# Patient Record
Sex: FEMALE | Race: WHITE
[De-identification: names, ages, dates, MRNs, and addresses within clinical notes are randomized per-mention and may not be internally consistent; named-entity substitution may affect disease eponyms.]

---

## 2018-11-08 ENCOUNTER — HOSPITAL ENCOUNTER (EMERGENCY)
Dept: HOSPITAL 19 - COL.ER | Age: 33
Discharge: HOME | End: 2018-11-08
Payer: SELF-PAY

## 2018-11-08 VITALS — BODY MASS INDEX: 33.53 KG/M2 | WEIGHT: 247.58 LBS | HEIGHT: 72 IN

## 2018-11-08 VITALS — SYSTOLIC BLOOD PRESSURE: 130 MMHG | TEMPERATURE: 98.9 F | HEART RATE: 82 BPM | DIASTOLIC BLOOD PRESSURE: 69 MMHG
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
## 2018-11-08 DIAGNOSIS — S00.93XA: Primary | ICD-10-CM

## 2019-03-26 ENCOUNTER — HOSPITAL ENCOUNTER (EMERGENCY)
Dept: HOSPITAL 19 - COL.ER | Age: 34
Discharge: HOME | End: 2019-03-26
Payer: SELF-PAY

## 2019-03-26 VITALS — BODY MASS INDEX: 32.16 KG/M2 | WEIGHT: 237.44 LBS | HEIGHT: 72 IN

## 2019-03-26 VITALS — SYSTOLIC BLOOD PRESSURE: 102 MMHG | DIASTOLIC BLOOD PRESSURE: 74 MMHG | HEART RATE: 63 BPM

## 2019-03-26 DIAGNOSIS — S06.0X0A: Primary | ICD-10-CM
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
## 2019-03-26 DIAGNOSIS — S14.109A: ICD-10-CM

## 2019-03-26 DIAGNOSIS — S70.01XA: ICD-10-CM

## 2019-03-26 DIAGNOSIS — H72.92: ICD-10-CM

## 2019-03-26 DIAGNOSIS — Z88.0: ICD-10-CM

## 2019-03-26 DIAGNOSIS — F17.210: ICD-10-CM
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
## 2019-03-26 DIAGNOSIS — S24.109A: ICD-10-CM

## 2019-03-26 LAB
ALBUMIN SERPL-MCNC: 4 GM/DL (ref 3.5–5)
ALP SERPL-CCNC: 74 U/L (ref 50–136)
ALT SERPL-CCNC: 8 U/L (ref 9–52)
ANION GAP SERPL CALC-SCNC: 7 MMOL/L (ref 7–16)
AST SERPL-CCNC: 14 U/L (ref 15–37)
BASOPHILS # BLD: 0 10*3/UL (ref 0–0.2)
BASOPHILS NFR BLD AUTO: 0.4 % (ref 0–2)
BILIRUB SERPL-MCNC: 0.1 MG/DL (ref 0–1)
BUN SERPL-MCNC: 13 MG/DL (ref 7–17)
CALCIUM SERPL-MCNC: 9.3 MG/DL (ref 8.4–10.2)
CHLORIDE SERPL-SCNC: 106 MMOL/L (ref 98–107)
CO2 SERPL-SCNC: 25 MMOL/L (ref 22–30)
CREAT SERPL-SCNC: 0.81 MG/DL (ref 0.52–1.25)
EOSINOPHIL # BLD: 0.1 10*3/UL (ref 0–0.7)
EOSINOPHIL NFR BLD: 0.9 % (ref 0–4)
ERYTHROCYTE [DISTWIDTH] IN BLOOD BY AUTOMATED COUNT: 18 % (ref 11.5–14.5)
GLUCOSE SERPL-MCNC: 102 MG/DL (ref 74–106)
GRANULOCYTES # BLD AUTO: 69.9 % (ref 42.2–75.2)
HCT VFR BLD AUTO: 31.9 % (ref 37–47)
HGB BLD-MCNC: 9.7 G/DL (ref 12.5–16)
LYMPHOCYTES # BLD: 2.3 10*3/UL (ref 1.2–3.4)
LYMPHOCYTES NFR BLD: 21.7 % (ref 20–51)
MCH RBC QN AUTO: 23 PG (ref 27–31)
MCHC RBC AUTO-ENTMCNC: 30 G/DL (ref 33–37)
MCV RBC AUTO: 75 FL (ref 80–100)
MONOCYTES # BLD: 0.7 10*3/UL (ref 0.1–0.6)
MONOCYTES NFR BLD AUTO: 6.8 % (ref 1.7–9.3)
NEUTROPHILS # BLD: 7.4 10*3/UL (ref 1.4–6.5)
PLATELET # BLD AUTO: 270 K/MM3 (ref 130–400)
PMV BLD AUTO: 9.5 FL (ref 7.4–10.4)
POTASSIUM SERPL-SCNC: 3.8 MMOL/L (ref 3.4–5)
PROT SERPL-MCNC: 7.5 GM/DL (ref 6.4–8.2)
RBC # BLD AUTO: 4.28 M/MM3 (ref 4.1–5.3)
SODIUM SERPL-SCNC: 139 MMOL/L (ref 137–145)

## 2020-03-16 ENCOUNTER — HOSPITAL ENCOUNTER (OUTPATIENT)
Dept: HOSPITAL 19 - COL.ER | Age: 35
LOS: 1 days | Discharge: HOME | End: 2020-03-17
Attending: UROLOGY
Payer: SELF-PAY

## 2020-03-16 VITALS — DIASTOLIC BLOOD PRESSURE: 47 MMHG | SYSTOLIC BLOOD PRESSURE: 104 MMHG | HEART RATE: 53 BPM | TEMPERATURE: 98.5 F

## 2020-03-16 VITALS — WEIGHT: 271.61 LBS | BODY MASS INDEX: 36.79 KG/M2 | HEIGHT: 72 IN

## 2020-03-16 VITALS — TEMPERATURE: 98.2 F | HEART RATE: 69 BPM | DIASTOLIC BLOOD PRESSURE: 55 MMHG | SYSTOLIC BLOOD PRESSURE: 114 MMHG

## 2020-03-16 VITALS — SYSTOLIC BLOOD PRESSURE: 105 MMHG | TEMPERATURE: 98.1 F | DIASTOLIC BLOOD PRESSURE: 51 MMHG | HEART RATE: 66 BPM

## 2020-03-16 DIAGNOSIS — N20.1: Primary | ICD-10-CM

## 2020-03-16 DIAGNOSIS — Z88.0: ICD-10-CM

## 2020-03-16 DIAGNOSIS — Z90.49: ICD-10-CM

## 2020-03-16 DIAGNOSIS — Z90.89: ICD-10-CM

## 2020-03-16 DIAGNOSIS — F17.210: ICD-10-CM

## 2020-03-16 DIAGNOSIS — Z88.8: ICD-10-CM

## 2020-03-16 LAB
ALBUMIN SERPL-MCNC: 4.8 GM/DL (ref 3.5–5)
ALP SERPL-CCNC: 86 U/L (ref 50–136)
ALT SERPL-CCNC: 16 U/L (ref 4–34)
ANION GAP SERPL CALC-SCNC: 12 MMOL/L (ref 7–16)
AST SERPL-CCNC: 22 U/L (ref 15–37)
BASOPHILS # BLD: 0.1 10*3/UL (ref 0–0.2)
BASOPHILS NFR BLD AUTO: 0.3 % (ref 0–2)
BILIRUB SERPL-MCNC: 0.3 MG/DL (ref 0–1)
BUN SERPL-MCNC: 13 MG/DL (ref 7–17)
CALCIUM SERPL-MCNC: 9.9 MG/DL (ref 8.4–10.2)
CHLORIDE SERPL-SCNC: 107 MMOL/L (ref 98–107)
CO2 SERPL-SCNC: 22 MMOL/L (ref 22–30)
CREAT SERPL-SCNC: 0.85 UMOL/L (ref 0.52–1.25)
EOSINOPHIL # BLD: 0 10*3/UL (ref 0–0.7)
EOSINOPHIL NFR BLD: 0.3 % (ref 0–4)
ERYTHROCYTE [DISTWIDTH] IN BLOOD BY AUTOMATED COUNT: 17.9 % (ref 11.5–14.5)
GLUCOSE SERPL-MCNC: 121 MG/DL (ref 74–106)
GRANULOCYTES # BLD AUTO: 82.6 % (ref 42.2–75.2)
HCG SERPL QL: NEGATIVE
HCT VFR BLD AUTO: 36.8 % (ref 37–47)
HGB BLD-MCNC: 10.9 G/DL (ref 12.5–16)
LIPASE SERPL-CCNC: 59 U/L (ref 23–300)
LYMPHOCYTES # BLD: 1.8 10*3/UL (ref 1.2–3.4)
LYMPHOCYTES NFR BLD: 12.1 % (ref 20–51)
MCH RBC QN AUTO: 22 PG (ref 27–31)
MCHC RBC AUTO-ENTMCNC: 30 G/DL (ref 33–37)
MCV RBC AUTO: 76 FL (ref 80–100)
MONOCYTES # BLD: 0.6 10*3/UL (ref 0.1–0.6)
MONOCYTES NFR BLD AUTO: 4.4 % (ref 1.7–9.3)
NEUTROPHILS # BLD: 11.9 10*3/UL (ref 1.4–6.5)
PH UR STRIP.AUTO: 5 [PH] (ref 5–8)
PLATELET # BLD AUTO: 309 K/MM3 (ref 130–400)
PMV BLD AUTO: 9.3 FL (ref 7.4–10.4)
POTASSIUM SERPL-SCNC: 4 MMOL/L (ref 3.4–5)
PROT SERPL-MCNC: 8.7 GM/DL (ref 6.4–8.2)
RBC # BLD AUTO: 4.87 M/MM3 (ref 4.1–5.3)
RBC # UR STRIP.AUTO: (no result) /UL
RBC # UR: >50 /HPF
SODIUM SERPL-SCNC: 141 MMOL/L (ref 137–145)
SP GR UR STRIP.AUTO: 1.02 (ref 1–1.03)
SQUAMOUS # URNS: (no result) /HPF
UA DIPSTICK PNL UR STRIP.AUTO: (no result)
URN COLLECT METHOD CLASS: (no result)

## 2020-03-16 PROCEDURE — C1769 GUIDE WIRE: HCPCS

## 2020-03-16 PROCEDURE — A4216 STERILE WATER/SALINE, 10 ML: HCPCS

## 2020-03-16 NOTE — NUR
Patient arrived to the floor at approximately 1700 from ED via wheelchair.
Patient is alert and oriented, answers questions appropriately. IVF to LAC per
order. Patient denies pain or nausea at this time, call light within reach.

## 2020-03-16 NOTE — NUR
Pt complains of pain to right flank and abdomen 7/10. Medicated with Zofran IV
and Morphine 2mg IVP at this time. IV site to left AC without redness or
swelling. Is alert and oriented x4. Will be NPO after midnight for surgery in
AM.

## 2020-03-17 VITALS — DIASTOLIC BLOOD PRESSURE: 61 MMHG | SYSTOLIC BLOOD PRESSURE: 121 MMHG | HEART RATE: 100 BPM

## 2020-03-17 VITALS — HEART RATE: 68 BPM | TEMPERATURE: 98.4 F | DIASTOLIC BLOOD PRESSURE: 40 MMHG | SYSTOLIC BLOOD PRESSURE: 92 MMHG

## 2020-03-17 VITALS — DIASTOLIC BLOOD PRESSURE: 40 MMHG | HEART RATE: 70 BPM | SYSTOLIC BLOOD PRESSURE: 119 MMHG

## 2020-03-17 VITALS — HEART RATE: 69 BPM | SYSTOLIC BLOOD PRESSURE: 120 MMHG | DIASTOLIC BLOOD PRESSURE: 61 MMHG

## 2020-03-17 VITALS — TEMPERATURE: 98.9 F

## 2020-03-17 VITALS — SYSTOLIC BLOOD PRESSURE: 101 MMHG | TEMPERATURE: 98.3 F | DIASTOLIC BLOOD PRESSURE: 45 MMHG | HEART RATE: 53 BPM

## 2020-03-17 VITALS — DIASTOLIC BLOOD PRESSURE: 47 MMHG | TEMPERATURE: 98.6 F | HEART RATE: 61 BPM | SYSTOLIC BLOOD PRESSURE: 110 MMHG

## 2020-03-17 VITALS — DIASTOLIC BLOOD PRESSURE: 68 MMHG | HEART RATE: 64 BPM | SYSTOLIC BLOOD PRESSURE: 120 MMHG

## 2020-03-17 NOTE — NUR
met with patient and patient's boyfriend Gaudencio
(ph#[phone]) to discuss discharge planning. Patient lives in [place]
with Gaudencio and sees Dr. Mendoza at [facility] for primary care. Patient
obtains medications from [email or web address] with no difficulties. Patient does
not use any DME and is independent with [facility]. Patient states Gaudencio is her
DPOA but that she does not have a copy with her. Patient plans to return home
upon discharge. PRATEEK inquired about patient's insurance because on census her
coverage is listed as out of state Medicaid. Patient states she has [employer] but she doesn't have her card with her. PRATEEK contacted Edson,
Financial Counselor and notified him of patient's coverage. SW to continue to
follow as needed.

## 2020-03-17 NOTE — NUR
Patient showered & Ready for Or this afternoon. Ivf per orders, toradol for
continued flank pain, she has voided, no stones strained.

## 2020-03-17 NOTE — NUR
Pt with severe nausea and minimal emesis. Zofran given IV at this time as well
as scheduled Toradol.

## 2020-03-17 NOTE — NUR
Patient has done well post op. She is ready to discharge home. Voided, red
tinged urine. Tolerated meal tray. All discharge paperwork reviewed. diet &
activity reviewed. Int dc. Flu shot given per request prior to discharge.
Patient wheeled out with all belongings.

## 2021-03-30 ENCOUNTER — HOSPITAL ENCOUNTER (EMERGENCY)
Dept: HOSPITAL 19 - COL.ER | Age: 36
LOS: 1 days | Discharge: HOME | End: 2021-03-31
Attending: EMERGENCY MEDICINE
Payer: SELF-PAY

## 2021-03-30 VITALS — WEIGHT: 251.55 LBS | BODY MASS INDEX: 35.22 KG/M2 | HEIGHT: 70.98 IN

## 2021-03-30 DIAGNOSIS — Z87.442: ICD-10-CM

## 2021-03-30 DIAGNOSIS — R10.9: Primary | ICD-10-CM

## 2021-03-30 DIAGNOSIS — R11.2: ICD-10-CM

## 2021-03-30 DIAGNOSIS — R31.9: ICD-10-CM

## 2021-03-30 DIAGNOSIS — F17.210: ICD-10-CM

## 2021-03-30 DIAGNOSIS — Z88.0: ICD-10-CM

## 2021-03-30 LAB
ALBUMIN SERPL-MCNC: 4.5 GM/DL (ref 3.5–5)
ALP SERPL-CCNC: 84 U/L (ref 50–136)
ALT SERPL-CCNC: 28 U/L (ref 4–34)
ANION GAP SERPL CALC-SCNC: 10 MMOL/L (ref 7–16)
AST SERPL-CCNC: 29 U/L (ref 15–37)
BASOPHILS # BLD: 0.1 10*3/UL (ref 0–0.2)
BASOPHILS NFR BLD AUTO: 0.5 % (ref 0–2)
BILIRUB SERPL-MCNC: < 0.1 MG/DL (ref 0–1)
BUN SERPL-MCNC: 13 MG/DL (ref 7–17)
CALCIUM SERPL-MCNC: 9.4 MG/DL (ref 8.4–10.2)
CHLORIDE SERPL-SCNC: 104 MMOL/L (ref 98–107)
CO2 SERPL-SCNC: 25 MMOL/L (ref 22–30)
CREAT SERPL-SCNC: 0.98 UMOL/L (ref 0.52–1.25)
CRP SERPL-MCNC: 1.9 MG/DL (ref 0–0.9)
EOSINOPHIL # BLD: 0.2 10*3/UL (ref 0–0.7)
EOSINOPHIL NFR BLD: 1.8 % (ref 0–4)
ERYTHROCYTE [DISTWIDTH] IN BLOOD BY AUTOMATED COUNT: 17.7 % (ref 11.5–14.5)
GLUCOSE SERPL-MCNC: 109 MG/DL (ref 74–106)
GRANULOCYTES # BLD AUTO: 62.8 % (ref 42.2–75.2)
HCT VFR BLD AUTO: 34.4 % (ref 37–47)
HGB BLD-MCNC: 10 G/DL (ref 12.5–16)
LYMPHOCYTES # BLD: 3.1 10*3/UL (ref 1.2–3.4)
LYMPHOCYTES NFR BLD: 26.8 % (ref 20–51)
MCH RBC QN AUTO: 21 PG (ref 27–31)
MCHC RBC AUTO-ENTMCNC: 29 G/DL (ref 33–37)
MCV RBC AUTO: 72 FL (ref 80–100)
MONOCYTES # BLD: 0.9 10*3/UL (ref 0.1–0.6)
MONOCYTES NFR BLD AUTO: 7.8 % (ref 1.7–9.3)
NEUTROPHILS # BLD: 7.3 10*3/UL (ref 1.4–6.5)
PH UR STRIP.AUTO: 5 [PH] (ref 5–8)
PLATELET # BLD AUTO: 382 K/MM3 (ref 130–400)
PMV BLD AUTO: 9.1 FL (ref 7.4–10.4)
POTASSIUM SERPL-SCNC: 3.6 MMOL/L (ref 3.4–5)
PROT SERPL-MCNC: 8.9 GM/DL (ref 6.4–8.2)
RBC # BLD AUTO: 4.76 M/MM3 (ref 4.1–5.3)
RBC # UR: (no result) /HPF
SODIUM SERPL-SCNC: 138 MMOL/L (ref 137–145)
SP GR UR STRIP.AUTO: 1.02 (ref 1–1.03)
SQUAMOUS # URNS: (no result) /HPF
UA DIPSTICK PNL UR STRIP.AUTO: (no result)
URN COLLECT METHOD CLASS: (no result)

## 2021-03-31 VITALS — TEMPERATURE: 98.3 F | HEART RATE: 64 BPM | SYSTOLIC BLOOD PRESSURE: 105 MMHG | DIASTOLIC BLOOD PRESSURE: 84 MMHG

## 2021-08-31 ENCOUNTER — HOSPITAL ENCOUNTER (EMERGENCY)
Dept: HOSPITAL 19 - COL.ER | Age: 36
Discharge: HOME | End: 2021-08-31
Attending: PERSONAL EMERGENCY RESPONSE ATTENDANT
Payer: SELF-PAY

## 2021-08-31 VITALS — WEIGHT: 258.6 LBS | BODY MASS INDEX: 36.2 KG/M2 | HEIGHT: 70.98 IN

## 2021-08-31 VITALS — TEMPERATURE: 99 F | DIASTOLIC BLOOD PRESSURE: 88 MMHG | SYSTOLIC BLOOD PRESSURE: 137 MMHG

## 2021-08-31 VITALS — HEART RATE: 96 BPM

## 2021-08-31 DIAGNOSIS — S60.211A: ICD-10-CM

## 2021-08-31 DIAGNOSIS — Z90.49: ICD-10-CM

## 2021-08-31 DIAGNOSIS — R10.9: ICD-10-CM

## 2021-08-31 DIAGNOSIS — F17.210: ICD-10-CM

## 2021-08-31 DIAGNOSIS — S90.112A: Primary | ICD-10-CM
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
